# Patient Record
Sex: MALE | Race: OTHER | HISPANIC OR LATINO | URBAN - METROPOLITAN AREA
[De-identification: names, ages, dates, MRNs, and addresses within clinical notes are randomized per-mention and may not be internally consistent; named-entity substitution may affect disease eponyms.]

---

## 2018-02-16 ENCOUNTER — EMERGENCY (EMERGENCY)
Facility: HOSPITAL | Age: 28
LOS: 0 days | Discharge: HOME | End: 2018-02-16
Attending: EMERGENCY MEDICINE

## 2018-02-16 VITALS
OXYGEN SATURATION: 97 % | HEART RATE: 79 BPM | DIASTOLIC BLOOD PRESSURE: 73 MMHG | RESPIRATION RATE: 18 BRPM | SYSTOLIC BLOOD PRESSURE: 144 MMHG | TEMPERATURE: 97 F

## 2018-02-16 VITALS
RESPIRATION RATE: 18 BRPM | TEMPERATURE: 97 F | HEART RATE: 72 BPM | OXYGEN SATURATION: 96 % | DIASTOLIC BLOOD PRESSURE: 86 MMHG | SYSTOLIC BLOOD PRESSURE: 171 MMHG

## 2018-02-16 DIAGNOSIS — R10.32 LEFT LOWER QUADRANT PAIN: ICD-10-CM

## 2018-02-16 DIAGNOSIS — R10.31 RIGHT LOWER QUADRANT PAIN: ICD-10-CM

## 2018-02-16 RX ORDER — ACETAMINOPHEN 500 MG
650 TABLET ORAL ONCE
Qty: 0 | Refills: 0 | Status: COMPLETED | OUTPATIENT
Start: 2018-02-16 | End: 2018-02-16

## 2018-02-16 RX ADMIN — Medication 650 MILLIGRAM(S): at 15:58

## 2018-02-16 NOTE — ED PROVIDER NOTE - NS ED ROS FT
Eyes:  No eye pain No visual changes, or discharge.  ENMT:  No ear pain No hearing changes, discharge or infections. No neck pain or stiffness. No throat pain  Cardiac:  No chest pain, SOB or edema.   Respiratory:  No cough or respiratory distress. No hemoptysis.   GI:  No nausea, vomiting, diarrhea, constipation or abdominal pain.  :  No dysuria, frequency or burning.  MS:  + abd wall pain  Neuro:  No headache, parasthesias or weakness.  No LOC.  Skin:  No skin rash.

## 2018-02-16 NOTE — ED PROVIDER NOTE - PHYSICAL EXAMINATION
CONSTITUTIONAL: Well-developed; well-nourished; in no acute distress.   SKIN: mildly ttp to rectus abdominus b/l with mild hematoma under skin, no erythema, induration or fluctuance  HEAD: Normocephalic; atraumatic.  EYES: PERRL, EOM, no conj injection, sclera clear  ENT: No nasal discharge; airway clear.  NECK: Supple; non tender.  No midline ttp ctls  CARD: S1, S2 normal; no murmurs, gallops, or rubs. Regular rate and rhythm. 2+ RPs and DPs bilat, no carotid bruits, no pedal edema, no calf pain b/l  RESP: CTAB good air movement No wheezes, rales or rhonchi.  ABD: soft nt, nd, no CVA ttp no rebound or quarding, bowel sounds x 4 ext  EXT: Normal ROM.  No clubbing, cyanosis or edema.   LYMPH: No acute cervical adenopathy.

## 2018-02-16 NOTE — ED PROVIDER NOTE - OBJECTIVE STATEMENT
Abd pain for 3 days after doing 60 sit ups for PT.  pain is to b/l lower rectus abdominus and feels "like a bruise" to patient.  No anusea, vomtiing, diarrhea, cellulitis, fevers, dysuria, hematuria.  No PMHx, no PSHx, no meds, no allergies.

## 2019-01-25 NOTE — ED PROVIDER NOTE - MEDICAL DECISION MAKING DETAILS
There are no preventive care reminders to display for this patient. Patient is up to date, no discussion needed. I personally evaluated the patient. I reviewed the Resident’s or Physician Assistant’s note (as assigned above), and agree with the findings and plan except as documented in my note.